# Patient Record
(demographics unavailable — no encounter records)

---

## 2025-01-06 NOTE — HEALTH RISK ASSESSMENT
[Patient reported colonoscopy was normal] : Patient reported colonoscopy was normal [HIV test declined] : HIV test declined [ColonoscopyDate] : 4/23

## 2025-01-06 NOTE — ASSESSMENT
[FreeTextEntry1] :  45M c hypothyroidism, acid reflux, obesity, LBB normal CT angio coronary 4/13/22 here for cpe & borderline bp  borderline bp - to observe  [facility] - check BW today - last ate ~4 hours ago   physical ecg performed - EKG L. BBB - occ PVCs - normal CT angio coronary 4/13/22  advised FBSE with derm - has appt scheduled  utd w/optho and dental exam   declined flu shot   declined covid vaccine   advised screening colonoscopy 4/23 - repeat in 5-10 years  advised covid bivalent booster  utd with tdap vaccine  can go for cxr - brother history of lung cancer  rtc in 6 months

## 2025-01-06 NOTE — PHYSICAL EXAM
[No Acute Distress] : no acute distress [Well Nourished] : well nourished [PERRL] : pupils equal round and reactive to light [Normal Oropharynx] : the oropharynx was normal [Normal TMs] : both tympanic membranes were normal [Normal Nasal Mucosa] : the nasal mucosa was normal [No Lymphadenopathy] : no lymphadenopathy [Supple] : supple [Thyroid Normal, No Nodules] : the thyroid was normal and there were no nodules present [No Accessory Muscle Use] : no accessory muscle use [Clear to Auscultation] : lungs were clear to auscultation bilaterally [Regular Rhythm] : with a regular rhythm [Normal S1, S2] : normal S1 and S2 [No Murmur] : no murmur heard [No Edema] : there was no peripheral edema [Soft] : abdomen soft [Non Tender] : non-tender [Non-distended] : non-distended [No Masses] : no abdominal mass palpated [No HSM] : no HSM [Normal Bowel Sounds] : normal bowel sounds [Penis Abnormality] : normal circumcised penis [Testes Tenderness] : no tenderness of the testes [Testes Mass (___cm)] : there were no testicular masses [Normal Supraclavicular Nodes] : no supraclavicular lymphadenopathy [Normal Posterior Cervical Nodes] : no posterior cervical lymphadenopathy [Normal Anterior Cervical Nodes] : no anterior cervical lymphadenopathy [Normal Affect] : the affect was normal [Normal Insight/Judgement] : insight and judgment were intact